# Patient Record
Sex: FEMALE | Employment: FULL TIME | URBAN - METROPOLITAN AREA
[De-identification: names, ages, dates, MRNs, and addresses within clinical notes are randomized per-mention and may not be internally consistent; named-entity substitution may affect disease eponyms.]

---

## 2022-03-04 ENCOUNTER — TELEPHONE (OUTPATIENT)
Dept: ORTHOPEDICS | Facility: CLINIC | Age: 62
End: 2022-03-04

## 2022-03-04 NOTE — TELEPHONE ENCOUNTER
Contacted patient regarding her referral. Not diabetic nor has she used nicotine in last 12 months. Explained to patient that she is currently at a BMI of 43 (weight 250 and height 5'4).  Explained to patient that for our program, we require BMI to be under 40 making her goal weight be 230 lbs.     Patient got very upset because she was told that she could have surgery now. States she had bariatric surgery 10/28/2021 and has a surgeon at home willing to operate on her now.  Advised patient that our surgeons request post-bariatric surgery patients to wait 12 months to have surgery due to the changes in weight and nutritional status and for best chance at wound healing.  Pt became rather angry because she was told she could have surgery now and that she was required to have surgery through a [facility] program.  Advised patient that bariatric surgery aside, she still would need to lose 20 lbs to be eligible for our program.  Also, she is not restricted to only our program - she can elect to have surgery locally. However, she will have to pay her out of pocket portion for the surgery.  Patient went on at length that she pays her premiums on time and that she pays higher out of pocket premiums, so that she does not have to pay out of pocket for surgery.  I validated Ms. Stein's pain in her joint as well as the burden of cost she is enduring.  However, at this time she is not a candidate for our program. We are happy to work with her in the Fall.  I advised her to contact her carrier RN for breakdown of out of pocket cost if she elects to have surgery locally.

## 2022-12-14 ENCOUNTER — TELEPHONE (OUTPATIENT)
Dept: ORTHOPEDICS | Facility: CLINIC | Age: 62
End: 2022-12-14
Payer: COMMERCIAL

## 2022-12-15 NOTE — TELEPHONE ENCOUNTER
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 Best time to call  varies.      -Patient

## 2023-03-08 ENCOUNTER — TELEPHONE (OUTPATIENT)
Dept: ORTHOPEDICS | Facility: CLINIC | Age: 63
End: 2023-03-08
Payer: COMMERCIAL

## 2023-03-08 NOTE — TELEPHONE ENCOUNTER
Contacted patient - advised we still have not received images from [facility] or [facility].  Patient asked if there was something she could do on her end - advised she could get discs and upload herself or mail discs to me.  Gave her mailing address to [employer] to if she picks them up.

## 2023-04-26 ENCOUNTER — TELEPHONE (OUTPATIENT)
Dept: ORTHOPEDICS | Facility: CLINIC | Age: 63
End: 2023-04-26
Payer: COMMERCIAL

## 2023-05-05 ENCOUNTER — TELEPHONE (OUTPATIENT)
Dept: ORTHOPEDICS | Facility: CLINIC | Age: 63
End: 2023-05-05
Payer: COMMERCIAL

## 2023-05-05 NOTE — TELEPHONE ENCOUNTER
Attempted to call pt for update, and to let her know we still have not received images. Left voicemail with callback #.